# Patient Record
Sex: FEMALE | Race: WHITE | Employment: PART TIME | URBAN - METROPOLITAN AREA
[De-identification: names, ages, dates, MRNs, and addresses within clinical notes are randomized per-mention and may not be internally consistent; named-entity substitution may affect disease eponyms.]

---

## 2017-12-20 PROBLEM — G47.09 OTHER INSOMNIA: Status: ACTIVE | Noted: 2017-12-20

## 2019-10-29 PROBLEM — G47.09 OTHER INSOMNIA: Status: RESOLVED | Noted: 2017-12-20 | Resolved: 2019-10-29

## 2022-12-01 NOTE — TELEPHONE ENCOUNTER
Medication:   Requested Prescriptions     Pending Prescriptions Disp Refills    diclofenac (VOLTAREN) 50 MG EC tablet 60 tablet 5          Patient Phone Number: [phone] (home) [phone] (work)    Last appt: 9/22/2021   Next appt: 1/25/2023    Last OARRS:   RX Monitoring 12/20/2017   Attestation The Prescription Monitoring Report for this patient was reviewed today. Periodic Controlled Substance Monitoring No signs of potential drug abuse or diversion identified.      PDMP Monitoring:    Last PDMP Zully farrar Reviewed [facility]):  Review User Review Instant Review Result          Preferred Pharmacy:   [facility] [id number] Nick Dozier   [address] [id number]  Phone: [phone] Fax: [phone]

## 2023-01-25 ENCOUNTER — OFFICE VISIT (OUTPATIENT)
Dept: FAMILY MEDICINE CLINIC | Age: 38
End: 2023-01-25

## 2023-01-25 VITALS
HEART RATE: 76 BPM | OXYGEN SATURATION: 98 % | DIASTOLIC BLOOD PRESSURE: 68 MMHG | SYSTOLIC BLOOD PRESSURE: 92 MMHG | TEMPERATURE: 98.4 F | WEIGHT: 121 LBS | BODY MASS INDEX: 20.77 KG/M2

## 2023-01-25 DIAGNOSIS — S39.012D STRAIN OF LUMBAR REGION, SUBSEQUENT ENCOUNTER: ICD-10-CM

## 2023-01-25 DIAGNOSIS — G60.9 IDIOPATHIC PERIPHERAL NEUROPATHY: Primary | ICD-10-CM

## 2023-01-25 PROCEDURE — 99214 OFFICE O/P EST MOD 30 MIN: CPT | Performed by: FAMILY MEDICINE

## 2023-01-25 RX ORDER — MELATONIN
1000 DAILY
Qty: 90 TABLET | Refills: 3
Start: 2023-01-25

## 2023-01-25 RX ORDER — GABAPENTIN 300 MG/1
300 CAPSULE ORAL EVERY EVENING
Qty: 90 CAPSULE | Refills: 0 | Status: SHIPPED | OUTPATIENT
Start: 2023-01-25 | End: 2023-04-25

## 2023-01-25 ASSESSMENT — PATIENT HEALTH QUESTIONNAIRE - PHQ9
SUM OF ALL RESPONSES TO PHQ QUESTIONS 1-9: 0
1. LITTLE INTEREST OR PLEASURE IN DOING THINGS: 0
2. FEELING DOWN, DEPRESSED OR HOPELESS: 0
SUM OF ALL RESPONSES TO PHQ QUESTIONS 1-9: 0
SUM OF ALL RESPONSES TO PHQ QUESTIONS 1-9: 0
SUM OF ALL RESPONSES TO PHQ9 QUESTIONS 1 & 2: 0
SUM OF ALL RESPONSES TO PHQ QUESTIONS 1-9: 0

## 2023-01-25 NOTE — PROGRESS NOTES
Subjective:      Patient ID: Ang Quinones is a 45 y.o. female. CC: Patient presents for re-evaluation of chronic health problems including leg numbness and chronic back pain. Pedro Luis ARGUETA Patient states she is here for a follow-up on her leg pain. She states she did go through physical therapy and has been taking Diclofenac. She states the Diclofenac does seem to help some. She only takes the diclofenac when she has discomfort. She has had issues with her back for well over 1 year. Her last office appointment here was September 2021. She also describes numbness and tingling in her feet and toes bilaterally. She feels her feet are always cold bothers her more at nighttime than any other time. Patient was taking anxiety medication in the past but she is not taking this for some time. Review of Systems      Patient Active Problem List   Diagnosis    Tobacco use disorder    Anxiety    Migraine syndrome       Outpatient Medications Marked as Taking for the 1/25/23 encounter (Office Visit) with Mila Hernandez MD   Medication Sig Dispense Refill    Calcium Carb-Cholecalciferol (CALCIUM 500+D) 500-5 MG-MCG TABS Take 1 tablet by mouth 2 times daily 60 tablet 5    diclofenac (VOLTAREN) 50 MG EC tablet TAKE ONE TABLET BY MOUTH THREE TIMES A DAY WITH A MEAL FOR 7-10 DAYS THEN AS NEEDED FOR PAIN 60 tablet 0    aspirin-acetaminophen-caffeine (EXCEDRIN MIGRAINE) 250-250-65 MG per tablet Take 1 tablet by mouth         Allergies   Allergen Reactions    Latex Itching and Rash    Penicillins Shortness Of Breath, Itching and Other (See Comments)     Burning   Other reaction(s):  Other (See Comments)  Burning     Sulfamethoxazole-Trimethoprim Nausea And Vomiting     Per patient Nausea and sweating, vomiting       Social History     Tobacco Use    Smoking status: Every Day     Packs/day: 2.00     Types: Cigarettes    Smokeless tobacco: Never   Substance Use Topics    Alcohol use: Yes     Comment: occassionaly       BP 92/68 (Site: Left Upper Arm, Position: Sitting, Cuff Size: Medium Adult)   Pulse 76   Temp 98.4 °F (36.9 °C) (Infrared)   Wt 121 lb (54.9 kg)   LMP 12/26/2022 (Approximate)   SpO2 98%   BMI 20.77 kg/m²       Objective:   Physical Exam  Constitutional:       General: She is not in acute distress. Appearance: She is well-developed. Musculoskeletal:      Lumbar back: Tenderness present. No swelling, edema, deformity or spasms. Normal range of motion. Negative right straight leg raise test (Sacroiliac joints bilaterally) and negative left straight leg raise test.      Right upper leg: Normal. No swelling, deformity or tenderness. Left upper leg: Normal. No swelling, deformity or tenderness. Skin:     General: Skin is warm and dry. Findings: No rash. Neurological:      General: No focal deficit present. Mental Status: She is alert and oriented to person, place, and time. Sensory: No sensory deficit. Motor: No weakness. Gait: Gait normal.      Deep Tendon Reflexes:      Reflex Scores:       Patellar reflexes are 2+ on the right side and 2+ on the left side. Achilles reflexes are 2+ on the right side and 2+ on the left side. Comments: Monofilament testing normal both feet   Psychiatric:         Behavior: Behavior is cooperative. Assessment:      [facility] was seen today for follow-up. Diagnoses and all orders for this visit:    Idiopathic peripheral neuropathy  -     Vitamin B12 & Folate; Future  -     C-Reactive Protein; Future    Strain of lumbar region, subsequent encounter  -     XR LUMBAR SPINE (2-3 VIEWS); Future    Other orders  -     diclofenac (VOLTAREN) 50 MG EC tablet; TAKE ONE TABLET BY MOUTH THREE TIMES A DAY WITH A MEAL FOR 7-10 DAYS THEN AS NEEDED FOR PAIN  -     vitamin D3 (CHOLECALCIFEROL) 25 MCG (1000 UT) TABS tablet; Take 1 tablet by mouth daily  -     gabapentin (NEURONTIN) 300 MG capsule; Take 1 capsule by mouth every evening for 90 days.  Intended supply: 90 days          Plan:      Proceed with laboratory testing for the peripheral neuropathy and spine x-ray. Patient is in agreement this point time to start gabapentin which should help with her chronic back pain and the neuropathy. She can report back in 1 month with the dosage needs to be adjusted. Patient was given written instruction regards to back exercises to do every day as she still may continue taking diclofenac on a as needed basis    RTC pending evaluation    Please note that this chart was generated using Dragon dictation software. Although every effort was made to ensure the accuracy of this automated transcription, some errors in transcription may have occurred.

## 2023-03-20 ENCOUNTER — OFFICE VISIT (OUTPATIENT)
Dept: FAMILY MEDICINE CLINIC | Age: 38
End: 2023-03-20
Payer: COMMERCIAL

## 2023-03-20 VITALS
BODY MASS INDEX: 21.28 KG/M2 | HEART RATE: 83 BPM | OXYGEN SATURATION: 98 % | SYSTOLIC BLOOD PRESSURE: 110 MMHG | DIASTOLIC BLOOD PRESSURE: 72 MMHG | TEMPERATURE: 98.2 F | WEIGHT: 124 LBS

## 2023-03-20 DIAGNOSIS — F41.9 ANXIETY: ICD-10-CM

## 2023-03-20 DIAGNOSIS — G60.9 IDIOPATHIC PERIPHERAL NEUROPATHY: Primary | ICD-10-CM

## 2023-03-20 PROCEDURE — 99214 OFFICE O/P EST MOD 30 MIN: CPT | Performed by: FAMILY MEDICINE

## 2023-03-20 PROCEDURE — G8482 FLU IMMUNIZE ORDER/ADMIN: HCPCS | Performed by: FAMILY MEDICINE

## 2023-03-20 PROCEDURE — G8427 DOCREV CUR MEDS BY ELIG CLIN: HCPCS | Performed by: FAMILY MEDICINE

## 2023-03-20 PROCEDURE — G8420 CALC BMI NORM PARAMETERS: HCPCS | Performed by: FAMILY MEDICINE

## 2023-03-20 PROCEDURE — 4004F PT TOBACCO SCREEN RCVD TLK: CPT | Performed by: FAMILY MEDICINE

## 2023-03-20 RX ORDER — CLONAZEPAM 0.5 MG/1
0.5 TABLET ORAL 2 TIMES DAILY PRN
Qty: 60 TABLET | Refills: 0 | Status: SHIPPED | OUTPATIENT
Start: 2023-03-20 | End: 2023-04-19

## 2023-03-20 RX ORDER — GABAPENTIN 400 MG/1
400 CAPSULE ORAL EVERY EVENING
Qty: 90 CAPSULE | Refills: 1 | Status: SHIPPED | OUTPATIENT
Start: 2023-03-20 | End: 2023-09-16

## 2023-03-20 RX ORDER — BUPROPION HYDROCHLORIDE 300 MG/1
300 TABLET ORAL EVERY MORNING
COMMUNITY

## 2023-03-20 ASSESSMENT — PATIENT HEALTH QUESTIONNAIRE - PHQ9
SUM OF ALL RESPONSES TO PHQ QUESTIONS 1-9: 2
SUM OF ALL RESPONSES TO PHQ QUESTIONS 1-9: 2
1. LITTLE INTEREST OR PLEASURE IN DOING THINGS: 1
SUM OF ALL RESPONSES TO PHQ9 QUESTIONS 1 & 2: 2
2. FEELING DOWN, DEPRESSED OR HOPELESS: 1
SUM OF ALL RESPONSES TO PHQ QUESTIONS 1-9: 2
SUM OF ALL RESPONSES TO PHQ QUESTIONS 1-9: 2

## 2023-03-20 NOTE — PROGRESS NOTES
Subjective:      Patient ID: David Hsieh is a 45 y.o. female. CC: Patient presents for re-evaluation of chronic health problems including neuropathy, back pain and anxiety. HPI Patient presents for a follow-up on her leg and back pain. She feels the back pain is relatively unchanged she continues taking the diclofenac on a as needed basis. She does feel the peripheral neuropathy in her feet has improved although she states is not 100%. She is having some daytime symptoms but she is able to sleep at nighttime. Patient also wants to discuss anxiety. She has been on Wellbutrin medication for anxiety and in the past was on BuSpar but did not feel the BuSpar was very effective. She requested a refill of clonazepam which she has not been on since 2019. Patient states that she thought she has ADD but her symptoms are mostly consistent with possible panic disorder. She does well for long periods of time and suddenly has panic symptoms. Review of Systems      Patient Active Problem List   Diagnosis    Tobacco use disorder    Anxiety    Migraine syndrome       Outpatient Medications Marked as Taking for the 3/20/23 encounter (Office Visit) with Jacki Pool MD   Medication Sig Dispense Refill    buPROPion (WELLBUTRIN XL) 300 MG extended release tablet Take 300 mg by mouth every morning      diclofenac (VOLTAREN) 50 MG EC tablet TAKE ONE TABLET BY MOUTH THREE TIMES A DAY WITH A MEAL FOR 7-10 DAYS THEN AS NEEDED FOR PAIN 60 tablet 5    vitamin D3 (CHOLECALCIFEROL) 25 MCG (1000 UT) TABS tablet Take 1 tablet by mouth daily 90 tablet 3    gabapentin (NEURONTIN) 300 MG capsule Take 1 capsule by mouth every evening for 90 days.  Intended supply: 90 days 90 capsule 0    aspirin-acetaminophen-caffeine (EXCEDRIN MIGRAINE) 250-250-65 MG per tablet Take 1 tablet by mouth         Allergies   Allergen Reactions    Latex Itching and Rash    Penicillins Shortness Of Breath, Itching and Other (See Comments)     Burning

## 2023-04-20 DIAGNOSIS — F41.9 ANXIETY: ICD-10-CM

## 2023-04-21 RX ORDER — CLONAZEPAM 0.5 MG/1
TABLET ORAL
Qty: 60 TABLET | Refills: 0 | Status: SHIPPED | OUTPATIENT
Start: 2023-04-21 | End: 2023-05-21

## 2023-04-21 NOTE — TELEPHONE ENCOUNTER
Medication:   Requested Prescriptions     Pending Prescriptions Disp Refills    clonazePAM (KLONOPIN) 0.5 MG tablet [Pharmacy Med Name: CLONAZEPAM 0.5MG TABLETS] 60 tablet      Sig: TAKE 1 TABLET BY MOUTH TWICE DAILY AS NEEDED FOR ANXIETY. MAX DAILY AMOUNT: 1 MG      Last Filled:  3/20/2023    Patient Phone Number: [phone] (home) [phone] (work)    Last appt: 3/20/2023   Next appt: 7/21/2023    Last OARRS:   RX Monitoring 12/20/2017   Attestation The Prescription Monitoring Report for this patient was reviewed today. Periodic Controlled Substance Monitoring No signs of potential drug abuse or diversion identified.      PDMP Monitoring:    Last PDMP Dottie Farooq as Reviewed [facility]):  Review User Review Instant Review Result          Preferred Pharmacy:   Jacky [address] [place], [place] [address]  [facility] [place]  [address]  Phone: [phone] Fax: [phone]

## 2023-05-11 DIAGNOSIS — G60.9 IDIOPATHIC PERIPHERAL NEUROPATHY: ICD-10-CM

## 2023-05-12 RX ORDER — PREGABALIN 75 MG/1
CAPSULE ORAL
Qty: 30 CAPSULE | Refills: 2 | Status: SHIPPED | OUTPATIENT
Start: 2023-05-12 | End: 2023-08-10

## 2023-05-22 DIAGNOSIS — F41.9 ANXIETY: ICD-10-CM

## 2023-05-22 RX ORDER — CLONAZEPAM 0.5 MG/1
TABLET ORAL
Qty: 60 TABLET | Refills: 2 | Status: SHIPPED | OUTPATIENT
Start: 2023-05-22 | End: 2023-06-21

## 2023-05-22 NOTE — TELEPHONE ENCOUNTER
Medication:   Requested Prescriptions     Pending Prescriptions Disp Refills    clonazePAM (KLONOPIN) 0.5 MG tablet [Pharmacy Med Name: CLONAZEPAM 0.5MG TABLETS] 60 tablet      Sig: TAKE 1 TABLET BY MOUTH TWICE DAILY AS NEEDED FOR ANXIETY. MAX DAILY AMOUNT: 1 MG      Last Filled:  4/21/2023    Patient Phone Number: [phone] (home) [phone] (work)    Last appt: 3/20/2023   Next appt: 7/21/2023    Last OARRS:   RX Monitoring 12/20/2017   Attestation The Prescription Monitoring Report for this patient was reviewed today. Periodic Controlled Substance Monitoring No signs of potential drug abuse or diversion identified.      PDMP Monitoring:    Last PDMP Ignacio Xavier as Reviewed [facility]):  Review User Review Instant Review Result   Deborah Mean 4/21/2023 10:58 AM Reviewed PDMP [1]     Preferred Pharmacy:   [facility] [address] [place], [place] [address]  Rachele Moore  [address]  Phone: [phone] Fax: [phone]

## 2023-07-18 SDOH — ECONOMIC STABILITY: TRANSPORTATION INSECURITY
IN THE PAST 12 MONTHS, HAS LACK OF TRANSPORTATION KEPT YOU FROM MEETINGS, WORK, OR FROM GETTING THINGS NEEDED FOR DAILY LIVING?: NO

## 2023-07-18 SDOH — ECONOMIC STABILITY: FOOD INSECURITY: WITHIN THE PAST 12 MONTHS, THE FOOD YOU BOUGHT JUST DIDN'T LAST AND YOU DIDN'T HAVE MONEY TO GET MORE.: SOMETIMES TRUE

## 2023-07-18 SDOH — ECONOMIC STABILITY: FOOD INSECURITY: WITHIN THE PAST 12 MONTHS, YOU WORRIED THAT YOUR FOOD WOULD RUN OUT BEFORE YOU GOT MONEY TO BUY MORE.: OFTEN TRUE

## 2023-07-18 SDOH — ECONOMIC STABILITY: HOUSING INSECURITY
IN THE LAST 12 MONTHS, WAS THERE A TIME WHEN YOU DID NOT HAVE A STEADY PLACE TO SLEEP OR SLEPT IN A SHELTER (INCLUDING NOW)?: NO

## 2023-07-18 SDOH — ECONOMIC STABILITY: INCOME INSECURITY: HOW HARD IS IT FOR YOU TO PAY FOR THE VERY BASICS LIKE FOOD, HOUSING, MEDICAL CARE, AND HEATING?: NOT VERY HARD

## 2023-07-21 ENCOUNTER — OFFICE VISIT (OUTPATIENT)
Dept: FAMILY MEDICINE CLINIC | Age: 38
End: 2023-07-21

## 2023-07-21 VITALS
TEMPERATURE: 98.2 F | BODY MASS INDEX: 22.31 KG/M2 | DIASTOLIC BLOOD PRESSURE: 70 MMHG | HEART RATE: 97 BPM | OXYGEN SATURATION: 95 % | SYSTOLIC BLOOD PRESSURE: 110 MMHG | WEIGHT: 130 LBS

## 2023-07-21 DIAGNOSIS — M60.9 MYOSITIS OF MULTIPLE SITES, UNSPECIFIED MYOSITIS TYPE: ICD-10-CM

## 2023-07-21 DIAGNOSIS — G60.9 IDIOPATHIC PERIPHERAL NEUROPATHY: Primary | ICD-10-CM

## 2023-07-21 RX ORDER — ZOLPIDEM TARTRATE 5 MG/1
5 TABLET ORAL NIGHTLY PRN
COMMUNITY
Start: 2023-06-07

## 2023-07-21 RX ORDER — DEXTROMETHORPHAN HYDROBROMIDE, BUPROPION HYDROCHLORIDE 105; 45 MG/1; MG/1
1 TABLET, MULTILAYER, EXTENDED RELEASE ORAL 2 TIMES DAILY
COMMUNITY
Start: 2023-07-20

## 2023-07-21 RX ORDER — ATOMOXETINE 80 MG/1
1 CAPSULE ORAL DAILY
COMMUNITY
Start: 2023-07-20

## 2023-07-21 RX ORDER — CARIPRAZINE 3 MG/1
3 CAPSULE, GELATIN COATED ORAL DAILY
COMMUNITY
Start: 2023-06-22

## 2023-07-21 RX ORDER — PREGABALIN 75 MG/1
CAPSULE ORAL
Qty: 30 CAPSULE | Refills: 5 | Status: SHIPPED | OUTPATIENT
Start: 2023-07-21 | End: 2024-01-21

## 2023-07-21 ASSESSMENT — PATIENT HEALTH QUESTIONNAIRE - PHQ9
SUM OF ALL RESPONSES TO PHQ QUESTIONS 1-9: 2
2. FEELING DOWN, DEPRESSED OR HOPELESS: 1
SUM OF ALL RESPONSES TO PHQ QUESTIONS 1-9: 2
1. LITTLE INTEREST OR PLEASURE IN DOING THINGS: 1
SUM OF ALL RESPONSES TO PHQ QUESTIONS 1-9: 2
SUM OF ALL RESPONSES TO PHQ9 QUESTIONS 1 & 2: 2
SUM OF ALL RESPONSES TO PHQ QUESTIONS 1-9: 2

## 2023-07-21 NOTE — PROGRESS NOTES
and all orders for this visit:    Idiopathic peripheral neuropathy  -     pregabalin (LYRICA) 75 MG capsule; TAKE 1 CAPSULE BY MOUTH EVERY NIGHT. MAX DAILY AMOUNT: 76 MG  -     Norberto Henriquez MD (Inpatient and Outpatient EMG), [facility]    Myositis of multiple sites, unspecified myositis type  -     CBC; Future  -     Comprehensive Metabolic Panel; Future    OARRS report checked          Plan:      Laboratory profile reviewed with patient and no clear metabolic etiologies for the neuropathy. We will go ahead and proceed with additional laboratory testing regards to the myositis but very atypical is just suddenly started in 10 days. For the neuropathy proceed with EMG to rule out any other abnormalities and go ahead and maintain the Lyrica at bedtime. For all her mental health issues and ADD advised patient that she needs to continue with her psychiatrist and that medications will not be prescribed from this office for her underlying mental health issues. RTC 6 months    Please note that this chart was generated using Dragon dictation software. Although every effort was made to ensure the accuracy of this automated transcription, some errors in transcription may have occurred.

## 2023-07-26 LAB
A/G RATIO: 1.7 (ref 1–2)
ALBUMIN SERPL-MCNC: 4.3 G/DL (ref 3.5–5.7)
ALBUMIN/PROTEIN TOTAL, SER/PL: 6.9 G/DL (ref 6–8.3)
ALP BLD-CCNC: 59 U/L (ref 34–104)
ALT SERPL-CCNC: 15 U/L (ref 7–52)
ANION GAP 4: 8 MMOL/L (ref 7–16)
AST W/O P-5'-P: 18 U/L (ref 13–39)
BILIRUB SERPL-MCNC: 0.5 MG/DL (ref 0.3–1)
BUN BLDV-MCNC: 9 MG/DL (ref 7–25)
CALCIUM SERPL-MCNC: 9.5 MG/DL (ref 8.6–10.2)
CHLORIDE BLD-SCNC: 105 MMOL/L (ref 98–107)
CO2: 26 MMOL/L (ref 21–31)
CREAT SERPL-MCNC: 0.73 MG/DL (ref 0.6–1.2)
EGFR FEMALE: > 90 ML/MIN/1.73M2
GLOBULIN: 2.6 G/DL (ref 2.6–4.2)
GLUCOSE: 97 MG/DL (ref 74–109)
HEMOGLOBIN URINE, QUAL: 12.8 G/DL (ref 12.1–15.8)
MCH RBC QN AUTO: 30.1 PG (ref 28.4–33.4)
MCHC RBC AUTO-ENTMCNC: 32.6 G/DL (ref 31.1–37)
MCV RBC AUTO: 92.3 FL (ref 85–99)
PDW BLD-RTO: 13.3 % (ref 11.7–15.2)
PLATELET COUNT MANUAL: 332 K/UL (ref 154–393)
POTASSIUM SERPL-SCNC: 3.8 MMOL/L (ref 3.5–5.1)
RBC # BLD: 4.24 M/UL (ref 3.86–5.17)
SODIUM BLD-SCNC: 139 MMOL/L (ref 136–145)
SR HEMATOCRIT: 39.2 % (ref 35.8–46.5)
WBC BLOOD, MANUAL: 11.2 K/UL (ref 4–10.5)

## 2023-07-27 ENCOUNTER — PATIENT MESSAGE (OUTPATIENT)
Dept: FAMILY MEDICINE CLINIC | Age: 38
End: 2023-07-27

## 2023-07-28 NOTE — TELEPHONE ENCOUNTER
From: LAURIE Bush  To: Eliazar Chris  Sent: 7/27/2023 4:01 PM EDT  Subject: Lab Results    Good afternoon Rabia,     I tried calling earlier and left a voicemail but wanted to reach out via [employer] just in case this is an easier way of reaching you. Dr. Edna Damon is out of the office today, so IVONE Mckinley reviewed your labs and states that they are normal. Please let me know if you have any questions or concerns.      Fatou Mahajan LPN

## 2023-08-15 ENCOUNTER — PROCEDURE VISIT (OUTPATIENT)
Dept: NEUROLOGY | Age: 38
End: 2023-08-15
Payer: COMMERCIAL

## 2023-08-15 DIAGNOSIS — R20.0 BILATERAL LEG NUMBNESS: Primary | ICD-10-CM

## 2023-08-15 PROCEDURE — 95886 MUSC TEST DONE W/N TEST COMP: CPT | Performed by: PSYCHIATRY & NEUROLOGY

## 2023-08-15 PROCEDURE — 95909 NRV CNDJ TST 5-6 STUDIES: CPT | Performed by: PSYCHIATRY & NEUROLOGY

## 2023-08-15 NOTE — PROGRESS NOTES
Kanchan Nicolas M.D. [facility]  Board Certified in [address], [address] [facility], [address] [place] [address]    EMG / NERVE CONDUCTION STUDY      PATIENT:  Kalpesh Manning       DATE OF EM/15/23     YOB: 1985       REASON FOR EMG:   Bilateral leg numbness      REFERRING PHYSICIAN:  Lashanda Zelaya MD  [address] [place] [address] Av     SUMMARY:   Bilateral peroneal motor nerve studies were normal  Bilateral posterior tibial motor nerve studies were normal  Bilateral sural sensory nerve studies were normal  Needle EMG of several muscles in both lower extremities was normal      CLINICAL DIAGNOSIS:  Neuropathy        EMG RESULTS:     This is a normal EMG and nerve conduction study of both lower extremities. There is no electrophysiological evidence for large fiber peripheral neuropathy in this study. ---------------------------------------------  Knachan Nicolas M.D.   Electromyographer / Neurologist

## 2023-09-01 RX ORDER — CYCLOBENZAPRINE HCL 5 MG
5 TABLET ORAL 3 TIMES DAILY PRN
Qty: 20 TABLET | Refills: 0 | Status: SHIPPED | OUTPATIENT
Start: 2023-09-01

## 2023-09-11 RX ORDER — CYCLOBENZAPRINE HCL 5 MG
5 TABLET ORAL 3 TIMES DAILY PRN
Qty: 20 TABLET | Refills: 0 | Status: SHIPPED | OUTPATIENT
Start: 2023-09-11

## 2023-10-04 RX ORDER — CYCLOBENZAPRINE HCL 5 MG
5 TABLET ORAL 3 TIMES DAILY PRN
Qty: 20 TABLET | Refills: 0 | Status: SHIPPED | OUTPATIENT
Start: 2023-10-04

## 2023-10-24 NOTE — TELEPHONE ENCOUNTER
Medication:   Requested Prescriptions     Pending Prescriptions Disp Refills    cyclobenzaprine (FLEXERIL) 5 MG tablet 20 tablet 0     Sig: Take 1 tablet by mouth 3 times daily as needed for Muscle spasms      Last Filled:     Patient Phone Number: [phone] (home) [phone] (work)    Last appt: 7/21/2023   Next appt: 1/22/2024    Last OARRS:       12/20/2017     5:30 PM   RX Monitoring   Attestation The Prescription Monitoring Report for this patient was reviewed today. Periodic Controlled Substance Monitoring No signs of potential drug abuse or diversion identified.      PDMP Monitoring:    Last PDMP Marie Cortez as Reviewed [facility]):  Review User Review Instant Review Result   Amanda Gracia 4/21/2023 10:58 AM Reviewed PDMP [1]     Preferred Pharmacy:   [facility] [address] [place] [address] [address] [address]  Phone: [phone] Fax: [phone]

## 2023-11-06 RX ORDER — CYCLOBENZAPRINE HCL 5 MG
5 TABLET ORAL 3 TIMES DAILY PRN
Qty: 20 TABLET | Refills: 0 | OUTPATIENT
Start: 2023-11-06

## 2023-11-06 RX ORDER — CYCLOBENZAPRINE HCL 5 MG
5 TABLET ORAL 3 TIMES DAILY PRN
Qty: 20 TABLET | Refills: 0 | Status: SHIPPED | OUTPATIENT
Start: 2023-11-06

## 2023-11-06 NOTE — TELEPHONE ENCOUNTER
Medication:   Requested Prescriptions     Pending Prescriptions Disp Refills    cyclobenzaprine (FLEXERIL) 5 MG tablet 20 tablet 0     Sig: Take 1 tablet by mouth 3 times daily as needed for Muscle spasms      Last Filled:  10/4/2023    Patient Phone Number: [phone] (home)     Last appt: 7/21/2023   Next appt: 11/5/2023    Last OARRS:       12/20/2017     5:30 PM   RX Monitoring   Attestation The Prescription Monitoring Report for this patient was reviewed today. Periodic Controlled Substance Monitoring No signs of potential drug abuse or diversion identified.      PDMP Monitoring:    Last PDMP Liz Gagnon as Reviewed [facility]):  Review User Review Instant Review Result   Diane Shellema 4/21/2023 10:58 AM Reviewed PDMP [1]     Preferred Pharmacy:   [facility] [address] [address] [address] [id number]  Phone: [phone] Fax: [phone]

## 2023-11-07 ENCOUNTER — OFFICE VISIT (OUTPATIENT)
Dept: FAMILY MEDICINE CLINIC | Age: 38
End: 2023-11-07
Payer: COMMERCIAL

## 2023-11-07 VITALS
DIASTOLIC BLOOD PRESSURE: 72 MMHG | TEMPERATURE: 98.1 F | WEIGHT: 134.38 LBS | RESPIRATION RATE: 12 BRPM | SYSTOLIC BLOOD PRESSURE: 102 MMHG | BODY MASS INDEX: 23.07 KG/M2 | HEART RATE: 88 BPM | OXYGEN SATURATION: 98 %

## 2023-11-07 DIAGNOSIS — R30.0 DYSURIA: Primary | ICD-10-CM

## 2023-11-07 LAB
BACTERIA URINE, POC: NORMAL
BILIRUBIN URINE: 0 MG/DL
BLOOD, URINE: POSITIVE
CASTS URINE, POC: 0
CLARITY: CLEAR
COLOR: YELLOW
CRYSTALS URINE, POC: 0
EPI CELLS URINE, POC: NORMAL
GLUCOSE URINE: NORMAL
KETONES, URINE: NEGATIVE
LEUKOCYTE EST, POC: NORMAL
NITRITE, URINE: NEGATIVE
PH UA: 6 (ref 4.5–8)
PROTEIN UA: NEGATIVE
RBC URINE, POC: NORMAL
SPECIFIC GRAVITY UA: 1.03 (ref 1–1.03)
UROBILINOGEN, URINE: NORMAL
WBC URINE, POC: 0
YEAST URINE, POC: 0

## 2023-11-07 PROCEDURE — 4004F PT TOBACCO SCREEN RCVD TLK: CPT | Performed by: FAMILY MEDICINE

## 2023-11-07 PROCEDURE — G8428 CUR MEDS NOT DOCUMENT: HCPCS | Performed by: FAMILY MEDICINE

## 2023-11-07 PROCEDURE — G8484 FLU IMMUNIZE NO ADMIN: HCPCS | Performed by: FAMILY MEDICINE

## 2023-11-07 PROCEDURE — 99213 OFFICE O/P EST LOW 20 MIN: CPT | Performed by: FAMILY MEDICINE

## 2023-11-07 PROCEDURE — 81000 URINALYSIS NONAUTO W/SCOPE: CPT | Performed by: FAMILY MEDICINE

## 2023-11-07 PROCEDURE — G8420 CALC BMI NORM PARAMETERS: HCPCS | Performed by: FAMILY MEDICINE

## 2023-11-07 RX ORDER — NITROFURANTOIN 25; 75 MG/1; MG/1
100 CAPSULE ORAL 2 TIMES DAILY
Qty: 10 CAPSULE | Refills: 0 | Status: SHIPPED | OUTPATIENT
Start: 2023-11-07 | End: 2023-11-12

## 2023-11-07 NOTE — PROGRESS NOTES
Subjective:      Patient ID: Catalina Priest is a 45 y.o. female. CC: Patient presents for acute medical problem-possible UTI. Medical assistant notes reviewed. HPI pt has been having burning with urination for about 4 days now. She does have some frequency and burning but denies any fevers or chills. She states is similar problems in September and went to urgent care center multiple times before symptoms improved. She denies vaginal discharge or irritation. Review of Systems  Allergies   Allergen Reactions    Latex Itching and Rash    Penicillins Shortness Of Breath, Itching and Other (See Comments)     Burning   Other reaction(s): Other (See Comments)  Burning   Burning   Burning   Other reaction(s): Other (See Comments)  Burning     Sulfamethoxazole-Trimethoprim Nausea And Vomiting     Per patient Nausea and sweating, vomiting      Objective:   Physical Exam  Constitutional:       General: She is not in acute distress. Appearance: Normal appearance. She is well-developed. Abdominal:      General: Bowel sounds are normal. There is no distension. Palpations: Abdomen is soft. Abdomen is not rigid. There is no hepatomegaly, splenomegaly or mass. Tenderness: There is no abdominal tenderness. There is no right CVA tenderness, left CVA tenderness, guarding or rebound. Hernia: No hernia is present. Skin:     General: Skin is warm. Findings: No rash. Neurological:      Mental Status: She is alert. Mental status is at baseline. Psychiatric:         Behavior: Behavior is cooperative. Assessment:      [facility] was seen today for urinary burning. Diagnoses and all orders for this visit:    Dysuria  -     POC URINE with Microscopic  -     Culture, Urine    Other orders  -     nitrofurantoin, macrocrystal-monohydrate, (MACROBID) 100 MG capsule; Take 1 capsule by mouth 2 times daily for 5 days            Plan:      Urine was sent for culture and start antibiotic at this time.     We

## 2023-11-10 LAB
BACTERIA UR CULT: ABNORMAL
BACTERIA UR CULT: ABNORMAL
ORGANISM: ABNORMAL

## 2023-11-24 RX ORDER — CYCLOBENZAPRINE HCL 5 MG
5 TABLET ORAL 3 TIMES DAILY PRN
Qty: 20 TABLET | Refills: 0 | Status: SHIPPED | OUTPATIENT
Start: 2023-11-24

## 2023-12-13 RX ORDER — CYCLOBENZAPRINE HCL 5 MG
5 TABLET ORAL 3 TIMES DAILY PRN
Qty: 20 TABLET | Refills: 0 | Status: SHIPPED | OUTPATIENT
Start: 2023-12-13

## 2023-12-13 NOTE — TELEPHONE ENCOUNTER
Medication:   Requested Prescriptions     Pending Prescriptions Disp Refills    cyclobenzaprine (FLEXERIL) 5 MG tablet 20 tablet 0     Sig: Take 1 tablet by mouth 3 times daily as needed for Muscle spasms      Last Filled:      Patient Phone Number: [phone] (home)     Last appt: 11/7/2023   Next appt: 12/28/2023    Last OARRS:       12/20/2017     5:30 PM   RX Monitoring   Attestation The Prescription Monitoring Report for this patient was reviewed today. Periodic Controlled Substance Monitoring No signs of potential drug abuse or diversion identified.      PDMP Monitoring:    Last PDMP Juan Sit as Reviewed [facility]):  Review User Review Instant Review Result   Radha Barker 4/21/2023 10:58 AM Reviewed PDMP [1]     Preferred Pharmacy:   [id number] [facility] [address] [place] [address] [address] [id number]  Phone: [phone] Fax: [phone]

## 2023-12-28 ENCOUNTER — OFFICE VISIT (OUTPATIENT)
Dept: FAMILY MEDICINE CLINIC | Age: 38
End: 2023-12-28
Payer: COMMERCIAL

## 2023-12-28 VITALS
DIASTOLIC BLOOD PRESSURE: 78 MMHG | OXYGEN SATURATION: 96 % | RESPIRATION RATE: 12 BRPM | HEART RATE: 88 BPM | BODY MASS INDEX: 22.85 KG/M2 | WEIGHT: 133.13 LBS | SYSTOLIC BLOOD PRESSURE: 98 MMHG | TEMPERATURE: 98.1 F

## 2023-12-28 DIAGNOSIS — G60.9 IDIOPATHIC PERIPHERAL NEUROPATHY: Primary | ICD-10-CM

## 2023-12-28 DIAGNOSIS — S39.012D STRAIN OF LUMBAR REGION, SUBSEQUENT ENCOUNTER: ICD-10-CM

## 2023-12-28 DIAGNOSIS — F17.200 TOBACCO USE DISORDER: ICD-10-CM

## 2023-12-28 DIAGNOSIS — F41.9 ANXIETY: ICD-10-CM

## 2023-12-28 PROCEDURE — 4004F PT TOBACCO SCREEN RCVD TLK: CPT | Performed by: FAMILY MEDICINE

## 2023-12-28 PROCEDURE — G8420 CALC BMI NORM PARAMETERS: HCPCS | Performed by: FAMILY MEDICINE

## 2023-12-28 PROCEDURE — G8482 FLU IMMUNIZE ORDER/ADMIN: HCPCS | Performed by: FAMILY MEDICINE

## 2023-12-28 PROCEDURE — 99214 OFFICE O/P EST MOD 30 MIN: CPT | Performed by: FAMILY MEDICINE

## 2023-12-28 PROCEDURE — G8427 DOCREV CUR MEDS BY ELIG CLIN: HCPCS | Performed by: FAMILY MEDICINE

## 2023-12-28 RX ORDER — PREGABALIN 75 MG/1
CAPSULE ORAL
Qty: 30 CAPSULE | Refills: 5 | Status: SHIPPED | OUTPATIENT
Start: 2023-12-28 | End: 2024-06-29

## 2023-12-28 RX ORDER — DEXTROAMPHETAMINE SACCHARATE, AMPHETAMINE ASPARTATE MONOHYDRATE, DEXTROAMPHETAMINE SULFATE AND AMPHETAMINE SULFATE 7.5; 7.5; 7.5; 7.5 MG/1; MG/1; MG/1; MG/1
30 CAPSULE, EXTENDED RELEASE ORAL DAILY
COMMUNITY
Start: 2023-12-14

## 2023-12-28 RX ORDER — DICLOFENAC SODIUM 75 MG/1
75 TABLET, DELAYED RELEASE ORAL 2 TIMES DAILY PRN
Qty: 60 TABLET | Refills: 5 | Status: SHIPPED | OUTPATIENT
Start: 2023-12-28

## 2023-12-28 NOTE — PROGRESS NOTES
maintain the Lyrica nightly and increase the dose of diclofenac.  Patient was instructed on back stretching exercises for myositis relief.    Encourage patient to discontinue smoking    Continue with psychiatric care    RTC 6 months        Please note that this chart was generated using Dragon dictation software. Although every effort was made to ensure the accuracy of this automated transcription, some errors in transcription may have occurred.

## 2024-01-03 RX ORDER — CYCLOBENZAPRINE HCL 5 MG
5 TABLET ORAL 3 TIMES DAILY PRN
Qty: 20 TABLET | Refills: 0 | Status: SHIPPED | OUTPATIENT
Start: 2024-01-03

## 2024-01-30 RX ORDER — CYCLOBENZAPRINE HCL 5 MG
5 TABLET ORAL 3 TIMES DAILY PRN
Qty: 20 TABLET | Refills: 0 | Status: SHIPPED | OUTPATIENT
Start: 2024-01-30

## 2024-01-30 NOTE — TELEPHONE ENCOUNTER
Medication:   Requested Prescriptions     Pending Prescriptions Disp Refills    cyclobenzaprine (FLEXERIL) 5 MG tablet 20 tablet 0     Sig: Take 1 tablet by mouth 3 times daily as needed for Muscle spasms      Last Filled:     Patient Phone Number: [phone] (home)     Last appt: 12/28/2023   Next appt: 7/1/2024    Last OARRS:       12/20/2017     5:30 PM   RX Monitoring   Attestation The Prescription Monitoring Report for this patient was reviewed today.   Periodic Controlled Substance Monitoring No signs of potential drug abuse or diversion identified.     PDMP Monitoring:    Last PDMP Abad as Reviewed ([place]):  Review User Review Instant Review Result   BENJAMIN DAUGHERTY 1/1/2024 10:59 AM Reviewed PDMP [1]     Preferred Pharmacy:   [facility] #[id number] - [place], [place] - [address] [place] [address] [phone] - F [phone]  [address] [place] [address]  Phone: [phone] Fax: [phone]

## 2024-02-06 ENCOUNTER — E-VISIT (OUTPATIENT)
Dept: FAMILY MEDICINE CLINIC | Age: 39
End: 2024-02-06
Payer: COMMERCIAL

## 2024-02-06 DIAGNOSIS — B96.89 ACUTE BACTERIAL SINUSITIS: Primary | ICD-10-CM

## 2024-02-06 DIAGNOSIS — J01.90 ACUTE BACTERIAL SINUSITIS: Primary | ICD-10-CM

## 2024-02-06 PROCEDURE — 99421 OL DIG E/M SVC 5-10 MIN: CPT | Performed by: FAMILY MEDICINE

## 2024-02-06 RX ORDER — DOXYCYCLINE HYCLATE 100 MG
100 TABLET ORAL 2 TIMES DAILY
Qty: 14 TABLET | Refills: 0 | Status: SHIPPED | OUTPATIENT
Start: 2024-02-06 | End: 2024-02-13

## 2024-02-06 ASSESSMENT — LIFESTYLE VARIABLES
SMOKING_STATUS: YES
SMOKING_YEARS: 17

## 2024-02-06 NOTE — PROGRESS NOTES
Subjective:      Patient ID: Rabia Denton is a 39 y.o. female.    HPI patient presents for an e-visit with a 2-week history of nasal congestion and purulent drainage.  No reported fevers or chills.  Has used over-the-counter medication with no improvement.    Review of Systems  Allergies   Allergen Reactions    Latex Itching and Rash    Penicillins Shortness Of Breath, Itching and Other (See Comments)     Burning   Other reaction(s): Other (See Comments)  Burning   Burning   Burning   Other reaction(s): Other (See Comments)  Burning     Sulfamethoxazole-Trimethoprim Nausea And Vomiting     Per patient Nausea and sweating, vomiting       Objective:   Physical Exam    Assessment:      Diagnoses and all orders for this visit:    Acute bacterial sinusitis    Other orders  -     doxycycline hyclate (VIBRA-TABS) 100 MG tablet; Take 1 tablet by mouth 2 times daily for 7 days            Plan:      Start antibiotic therapy for symptoms sinusitis  RTC as needed    5-10 minutes were spent on the digital evaluation and management of this patient.        Please note that this chart was generated using Dragon dictation software. Although every effort was made to ensure the accuracy of this automated transcription, some errors in transcription may have occurred.          Edouard Ashley MD

## 2024-02-15 RX ORDER — CYCLOBENZAPRINE HCL 5 MG
5 TABLET ORAL 3 TIMES DAILY PRN
Qty: 20 TABLET | Refills: 0 | Status: SHIPPED | OUTPATIENT
Start: 2024-02-15

## 2024-03-11 RX ORDER — CYCLOBENZAPRINE HCL 5 MG
5 TABLET ORAL 3 TIMES DAILY PRN
Qty: 20 TABLET | Refills: 0 | Status: SHIPPED | OUTPATIENT
Start: 2024-03-11

## 2024-03-18 DIAGNOSIS — G60.9 IDIOPATHIC PERIPHERAL NEUROPATHY: ICD-10-CM

## 2024-03-18 RX ORDER — PREGABALIN 75 MG/1
75 CAPSULE ORAL 2 TIMES DAILY
Qty: 60 CAPSULE | Refills: 3 | Status: SHIPPED | OUTPATIENT
Start: 2024-03-18 | End: 2024-07-16

## 2024-04-09 RX ORDER — CYCLOBENZAPRINE HCL 5 MG
5 TABLET ORAL 3 TIMES DAILY PRN
Qty: 20 TABLET | Refills: 0 | Status: SHIPPED | OUTPATIENT
Start: 2024-04-09

## 2024-04-29 ENCOUNTER — E-VISIT (OUTPATIENT)
Dept: FAMILY MEDICINE CLINIC | Age: 39
End: 2024-04-29
Payer: COMMERCIAL

## 2024-04-29 DIAGNOSIS — B37.31 YEAST VAGINITIS: Primary | ICD-10-CM

## 2024-04-29 PROCEDURE — 99421 OL DIG E/M SVC 5-10 MIN: CPT | Performed by: FAMILY MEDICINE

## 2024-04-29 RX ORDER — FLUCONAZOLE 150 MG/1
150 TABLET ORAL ONCE
Qty: 1 TABLET | Refills: 0 | Status: SHIPPED | OUTPATIENT
Start: 2024-04-29 | End: 2024-04-29

## 2024-04-29 NOTE — PROGRESS NOTES
Subjective   Patient ID: Rabia Denton is a 39 y.o. female.    HPI patient per e-visit for vaginal symptoms with itching and discharge.  Patient tried over-the-counter Monistat with no symptom improvement.    Review of Systems     Allergies   Allergen Reactions    Latex Itching and Rash    Penicillins Shortness Of Breath, Itching and Other (See Comments)     Burning   Other reaction(s): Other (See Comments)  Burning   Burning   Burning   Other reaction(s): Other (See Comments)  Burning     Sulfamethoxazole-Trimethoprim Nausea And Vomiting     Per patient Nausea and sweating, vomiting         Objective   Physical Exam       Assessment   Diagnoses and all orders for this visit:    Yeast vaginitis    Other orders  -     fluconazole (DIFLUCAN) 150 MG tablet; Take 1 tablet by mouth once for 1 dose            Plan   Patient was instructed to take the Diflucan medication and if symptoms persist she will need appointment for further investigation.    5-10 minutes were spent on the digital evaluation and management of this patient.        Please note that this chart was generated using Dragon dictation software. Although every effort was made to ensure the accuracy of this automated transcription, some errors in transcription may have occurred.          Edouard Ashley MD

## 2024-05-14 RX ORDER — CYCLOBENZAPRINE HCL 5 MG
5 TABLET ORAL 3 TIMES DAILY PRN
Qty: 20 TABLET | Refills: 0 | Status: SHIPPED | OUTPATIENT
Start: 2024-05-14

## 2024-06-06 RX ORDER — CYCLOBENZAPRINE HCL 5 MG
5 TABLET ORAL 3 TIMES DAILY PRN
Qty: 20 TABLET | Refills: 0 | Status: SHIPPED | OUTPATIENT
Start: 2024-06-06

## 2024-06-06 NOTE — TELEPHONE ENCOUNTER
Medication:   Requested Prescriptions     Pending Prescriptions Disp Refills    cyclobenzaprine (FLEXERIL) 5 MG tablet 20 tablet 0     Sig: Take 1 tablet by mouth 3 times daily as needed for Muscle spasms      Last Filled:      Patient Phone Number: [phone] (home)     Last appt: 4/29/2024   Next appt: 7/1/2024    Last OARRS:       12/20/2017     5:30 PM   RX Monitoring   Attestation The Prescription Monitoring Report for this patient was reviewed today.   Periodic Controlled Substance Monitoring No signs of potential drug abuse or diversion identified.     PDMP Monitoring:    Last PDMP Abad as Reviewed ([place]):  Review User Review Instant Review Result   BENJAMIN DAUGHERTY 1/1/2024 10:59 AM Reviewed PDMP [1]     Preferred Pharmacy:   [facility] #[id number] - [place], [place] - [address] [place] [address] [phone] - F [phone]  [address] [place] [address]  Phone: [phone] Fax: [phone]

## 2024-07-01 ENCOUNTER — OFFICE VISIT (OUTPATIENT)
Dept: FAMILY MEDICINE CLINIC | Age: 39
End: 2024-07-01
Payer: COMMERCIAL

## 2024-07-01 VITALS
BODY MASS INDEX: 25.43 KG/M2 | OXYGEN SATURATION: 98 % | RESPIRATION RATE: 12 BRPM | TEMPERATURE: 97.4 F | DIASTOLIC BLOOD PRESSURE: 82 MMHG | WEIGHT: 148.13 LBS | HEART RATE: 64 BPM | SYSTOLIC BLOOD PRESSURE: 110 MMHG

## 2024-07-01 DIAGNOSIS — Z00.00 WELL ADULT EXAM: ICD-10-CM

## 2024-07-01 DIAGNOSIS — G60.9 IDIOPATHIC PERIPHERAL NEUROPATHY: Primary | ICD-10-CM

## 2024-07-01 DIAGNOSIS — R42 VERTIGO: ICD-10-CM

## 2024-07-01 DIAGNOSIS — S39.012D STRAIN OF LUMBAR REGION, SUBSEQUENT ENCOUNTER: ICD-10-CM

## 2024-07-01 PROCEDURE — 99214 OFFICE O/P EST MOD 30 MIN: CPT | Performed by: FAMILY MEDICINE

## 2024-07-01 PROCEDURE — G8427 DOCREV CUR MEDS BY ELIG CLIN: HCPCS | Performed by: FAMILY MEDICINE

## 2024-07-01 PROCEDURE — G8419 CALC BMI OUT NRM PARAM NOF/U: HCPCS | Performed by: FAMILY MEDICINE

## 2024-07-01 PROCEDURE — 4004F PT TOBACCO SCREEN RCVD TLK: CPT | Performed by: FAMILY MEDICINE

## 2024-07-01 RX ORDER — PREGABALIN 75 MG/1
75 CAPSULE ORAL 2 TIMES DAILY
Qty: 60 CAPSULE | Refills: 5 | Status: SHIPPED | OUTPATIENT
Start: 2024-07-01 | End: 2024-12-28

## 2024-07-01 RX ORDER — DICLOFENAC SODIUM 75 MG/1
75 TABLET, DELAYED RELEASE ORAL 2 TIMES DAILY PRN
Qty: 60 TABLET | Refills: 5 | Status: SHIPPED | OUTPATIENT
Start: 2024-07-01

## 2024-07-01 RX ORDER — BUPROPION HYDROCHLORIDE 300 MG/1
300 TABLET ORAL EVERY MORNING
COMMUNITY

## 2024-07-01 ASSESSMENT — PATIENT HEALTH QUESTIONNAIRE - PHQ9
SUM OF ALL RESPONSES TO PHQ9 QUESTIONS 1 & 2: 0
1. LITTLE INTEREST OR PLEASURE IN DOING THINGS: NOT AT ALL
2. FEELING DOWN, DEPRESSED OR HOPELESS: NOT AT ALL
SUM OF ALL RESPONSES TO PHQ QUESTIONS 1-9: 0

## 2024-07-01 NOTE — PROGRESS NOTES
Subjective   Patient ID: Rabia Denton is a 39 y.o. female.  CC: Patient presents for re-evaluation of chronic health problems including peripheral neuropathy, vertigo, low back pain and depression/anxiety..    HPI pt is here for a follow up, med refill, and states she has been having dizzy spells, ongoing for a months.  Patient continues to have the peripheral neuropathy symptoms in her feet and her lower calfs.  She denies any difficulty with walking or gait.  She is having persistent low back pain which she believes is related to her job.  She developed vertigo over the last 4 to 6 weeks on an intermittent basis.  She denies any tinnitus symptoms.  New medications from last appointment time include Adderall as she continues with Ambien at nighttime and bupropion daily.    Review of Systems     Patient Active Problem List   Diagnosis    Tobacco use disorder    Anxiety    Migraine syndrome    Idiopathic peripheral neuropathy       Outpatient Medications Marked as Taking for the 7/1/24 encounter (Office Visit) with Edouard Ashley MD   Medication Sig Dispense Refill    buPROPion (WELLBUTRIN XL) 300 MG extended release tablet Take 1 tablet by mouth every morning      cyclobenzaprine (FLEXERIL) 5 MG tablet Take 1 tablet by mouth 3 times daily as needed for Muscle spasms 20 tablet 0    pregabalin (LYRICA) 75 MG capsule Take 1 capsule by mouth 2 times daily for 120 days. Max Daily Amount: 150 mg 60 capsule 3    amphetamine-dextroamphetamine (ADDERALL XR) 30 MG extended release capsule Take 1 capsule by mouth daily.      diclofenac (VOLTAREN) 75 MG EC tablet Take 1 tablet by mouth 2 times daily as needed for Pain TAKE ONE TABLET BY MOUTH THREE TIMES A DAY WITH A MEAL FOR 7-10 DAYS THEN AS NEEDED FOR PAIN 60 tablet 5    zolpidem (AMBIEN) 5 MG tablet Take 1 tablet by mouth nightly as needed.         Allergies   Allergen Reactions    Latex Itching and Rash    Penicillins Shortness Of Breath, Itching and Other (See

## 2024-07-18 RX ORDER — CYCLOBENZAPRINE HCL 5 MG
5 TABLET ORAL 3 TIMES DAILY PRN
Qty: 20 TABLET | Refills: 0 | Status: SHIPPED | OUTPATIENT
Start: 2024-07-18

## 2024-08-08 LAB
A/G RATIO: 1.3 (ref 1–2)
ALBUMIN: 4.4 G/DL (ref 3.5–5.7)
ALP BLD-CCNC: 65 U/L (ref 34–104)
ALT SERPL-CCNC: 16 U/L (ref 7–52)
ANION GAP SERPL CALCULATED.3IONS-SCNC: 8 MMOL/L (ref 7–16)
AST SERPL-CCNC: 26 U/L (ref 13–39)
BILIRUB SERPL-MCNC: 0.3 MG/DL (ref 0.3–1)
BUN BLDV-MCNC: 12 MG/DL (ref 7–25)
CALCIUM SERPL-MCNC: 9.6 MG/DL (ref 8.6–10.2)
CHLORIDE BLD-SCNC: 105 MMOL/L (ref 98–107)
CHOLESTEROL, TOTAL: 170 MG/DL
CO2: 25 MMOL/L (ref 21–31)
CREAT SERPL-MCNC: 0.7 MG/DL (ref 0.6–1.2)
EGFR FEMALE: >90 ML/MIN/1.73M2
GLOBULIN: 3.3 G/DL (ref 2.6–4.2)
GLUCOSE BLD-MCNC: 91 MG/DL (ref 74–109)
HDLC SERPL-MCNC: 39 MG/DL (ref 40–60)
LDL CHOLESTEROL: 89 MG/DL (ref 0–99)
POTASSIUM SERPL-SCNC: 4.1 MMOL/L (ref 3.5–5.1)
SODIUM BLD-SCNC: 138 MMOL/L (ref 136–145)
TOTAL PROTEIN: 7.7 G/DL (ref 6–8.3)
TRIGL SERPL-MCNC: 211 MG/DL
VLDLC SERPL CALC-MCNC: 42 MG/DL (ref 0–40)